# Patient Record
Sex: MALE | Race: OTHER | HISPANIC OR LATINO
[De-identification: names, ages, dates, MRNs, and addresses within clinical notes are randomized per-mention and may not be internally consistent; named-entity substitution may affect disease eponyms.]

---

## 2022-09-16 ENCOUNTER — NON-APPOINTMENT (OUTPATIENT)
Age: 42
End: 2022-09-16

## 2022-09-23 PROBLEM — Z00.00 ENCOUNTER FOR PREVENTIVE HEALTH EXAMINATION: Status: ACTIVE | Noted: 2022-09-23

## 2022-09-30 ENCOUNTER — APPOINTMENT (OUTPATIENT)
Dept: HEART AND VASCULAR | Facility: CLINIC | Age: 42
End: 2022-09-30

## 2024-09-08 ENCOUNTER — NON-APPOINTMENT (OUTPATIENT)
Age: 44
End: 2024-09-08

## 2024-09-09 ENCOUNTER — APPOINTMENT (OUTPATIENT)
Age: 44
End: 2024-09-09

## 2024-09-09 VITALS
WEIGHT: 184 LBS | HEIGHT: 69 IN | OXYGEN SATURATION: 97 % | HEART RATE: 87 BPM | BODY MASS INDEX: 27.25 KG/M2 | DIASTOLIC BLOOD PRESSURE: 78 MMHG | SYSTOLIC BLOOD PRESSURE: 106 MMHG

## 2024-09-11 ENCOUNTER — APPOINTMENT (OUTPATIENT)
Age: 44
End: 2024-09-11

## 2024-09-11 VITALS
HEART RATE: 90 BPM | HEIGHT: 69 IN | BODY MASS INDEX: 27.25 KG/M2 | OXYGEN SATURATION: 96 % | SYSTOLIC BLOOD PRESSURE: 110 MMHG | DIASTOLIC BLOOD PRESSURE: 80 MMHG | WEIGHT: 184 LBS

## 2024-09-11 DIAGNOSIS — S82.842D DISPLACED BIMALLEOLAR FRACTURE OF LEFT LOWER LEG, SUBSEQUENT ENCOUNTER FOR CLOSED FRACTURE WITH ROUTINE HEALING: ICD-10-CM

## 2024-09-11 PROCEDURE — 73610 X-RAY EXAM OF ANKLE: CPT | Mod: LT

## 2024-09-11 PROCEDURE — 27814 TREATMENT OF ANKLE FRACTURE: CPT | Mod: LT

## 2024-09-11 PROCEDURE — 99204 OFFICE O/P NEW MOD 45 MIN: CPT | Mod: 25

## 2024-09-11 NOTE — HISTORY OF PRESENT ILLNESS
[de-identified] : - Summary : The patient came for a follow-up after surgery for an ankle fracture which occurred due to a slip and fall incident in [place] on August 19th. There was immediate intervention and surgical procedure involving the insertion of a plate was performed on August 23rd.  Open reduction internal fixation of left bimalleolar ankle fracture - History of Present Illness : The patient works as a  and was in [place] for work when the accident occurred. The patient had surgery on August 23rd, which involved the insertion of a plate to address the fracture in two places. The patient has just taken off the splint and is keen to promote healing and improve mobility.

## 2024-09-11 NOTE — ASSESSMENT
[FreeTextEntry1] : - Right bimalleolar ankle fracture status post open reduction internal fixation on August 23, 2024: The patient's healing progress after surgery is satisfactory. The operative wound is clean, and the fracture healing progress as per the X-rays is satisfactory. - Therapeutic Interventions: Continue with aspirin 81mg for another one and a half weeks.  Splint was removed.  Patient was placed into a cam walker boot.  - Diagnostic Tests: Plan to review healing progress at the six-week post-operative [place] (four weeks from the current appointment).  - Patient Education: Educate the patient to move the ankle and toes, spell alphabets for mobility, and not to put weight on the ankle yet.  - Follow-Up: Set up an appointment for four weeks from now.  Anticipate weightbearing at that time.  Repeat x-rays of the left ankle at that visit 3 views AP mortise and lateral

## 2024-09-11 NOTE — END OF VISIT
[] : 4 [FeedbackText] : The system does not seem to fully grasp orthopedic language.  It would be nice to have it more tailored to orthopedics.

## 2024-09-11 NOTE — PHYSICAL EXAM
[de-identified] : Left lower extremity - Incisions healing appropriately - Able to dorsiflex planus ankle and toes - Sensation decreased on the dorsum of his foot - Foot warm well-perfused [de-identified] : 3 views of the left ankle obtained today: Demonstrates bimalleolar ankle fracture status post fixation fracture alignment in place concentric mortise  Immediate postoperative x-rays are brought in by the patient were reviewed.  Demonstrates hardware intact primary ankle fracture status post fixation.  This my independent are potation

## 2024-09-30 ENCOUNTER — APPOINTMENT (OUTPATIENT)
Age: 44
End: 2024-09-30
Payer: MEDICAID

## 2024-09-30 VITALS
HEIGHT: 69 IN | SYSTOLIC BLOOD PRESSURE: 117 MMHG | WEIGHT: 184 LBS | BODY MASS INDEX: 27.25 KG/M2 | DIASTOLIC BLOOD PRESSURE: 83 MMHG

## 2024-09-30 DIAGNOSIS — T81.30XA DISRUPTION OF WOUND, UNSPECIFIED, INITIAL ENCOUNTER: ICD-10-CM

## 2024-09-30 DIAGNOSIS — S82.842D DISPLACED BIMALLEOLAR FRACTURE OF LEFT LOWER LEG, SUBSEQUENT ENCOUNTER FOR CLOSED FRACTURE WITH ROUTINE HEALING: ICD-10-CM

## 2024-09-30 PROCEDURE — 99024 POSTOP FOLLOW-UP VISIT: CPT

## 2024-09-30 PROCEDURE — 73610 X-RAY EXAM OF ANKLE: CPT | Mod: LT

## 2024-09-30 RX ORDER — SILVER SULFADIAZINE 10 MG/G
1 CREAM TOPICAL TWICE DAILY
Qty: 1 | Refills: 0 | Status: ACTIVE | COMMUNITY
Start: 2024-09-30 | End: 1900-01-01

## 2024-10-01 NOTE — HISTORY OF PRESENT ILLNESS
[de-identified] : - Summary : The patient came for a follow-up after surgery for an ankle fracture which occurred due to a slip and fall incident in [place] on August 19th. There was immediate intervention and surgical procedure involving the insertion of a plate was performed on August 23rd.  Open reduction internal fixation of left bimalleolar ankle fracture - History of Present Illness : The patient works as a  and was in [place] for work when the accident occurred. The patient had surgery on August 23rd, which involved the insertion of a plate to address the fracture in two places. The patient has just taken off the splint and is keen to promote healing and improve mobility.  9/30/24  The patient is now approximately six weeks post-operative, and repeat X-rays of the left ankle were obtained during the current visit. The X-rays demonstrate that the hardware is in place, and the reduction is maintained. The patient reports that the ankle is feeling better, and he can balance better than before, although he has not been walking. He mentions experiencing some numbness in the area, which was also present during the previous visit. The patient inquires about submerging the wound in water and is advised to keep it dry, covering it with gauze when going out and allowing it to breathe at home.

## 2024-10-01 NOTE — ASSESSMENT
[FreeTextEntry1] : - Left Bimalleolar Ankle Fracture Status Post Open Reduction and Internal Fixation : The patient is recovering well from the left bimalleolar ankle fracture and subsequent open reduction and internal fixation surgery. The X-rays demonstrate that the hardware is in place, and the reduction is maintained.  - Therapeutic Interventions: The patient can start weight-bearing and walking with the assistance of crutches and a boot. Physical therapy will be initiated to aid in the rehabilitation process and improve range of motion. A topical medication Silvadene will be prescribed for the wound area.  - Diagnostic Tests: No additional diagnostic tests are recommended at this time.  - Referrals: A referral for physical therapy will be provided.  - Patient Education: The patient will be educated on the gradual progression of weight-bearing and walking, as well as the expected timeline for recovery. Instructions on wound care and the use of the prescribed topical medication will be provided.  - Follow-Up: The patient will be scheduled for a follow-up appointment in 2 weeks to monitor the healing progress of the wound and assess the need for further treatment or adjustments to the rehabilitation plan. 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 - Therapeutic Interventions: No specific interventions are recommended at this time, as the nerve dysfunction is expected to resolve spontaneously over time unless it is indeed severed  - Diagnostic Tests: No additional diagnostic tests are needed.  - Referrals: No referrals are necessary.  - Patient Education: The patient will be educated that the numbness is likely temporary and can take up to a year to resolve completely. Reassurance will be provided that this is a common occurrence after such surgeries.  - Follow-Up: The nerve function will be monitored during subsequent follow-up visits, and further evaluation or treatment will be considered if the symptoms persist or worsen.

## 2024-10-01 NOTE — PHYSICAL EXAM
[de-identified] : Left lower extremity - small area lateral wound not fully healed, no surrounding erythema - Able to dorsiflex PFankle and toes - Sensation decreased on the dorsum of his foot - Foot warm well-perfused [de-identified] :  Repeat X-rays of the left ankle 3 views  were obtained, demonstrating that the hardware is in place, and the reduction is maintained.

## 2024-10-14 ENCOUNTER — APPOINTMENT (OUTPATIENT)
Age: 44
End: 2024-10-14

## 2024-12-02 ENCOUNTER — APPOINTMENT (OUTPATIENT)
Age: 44
End: 2024-12-02

## 2024-12-02 VITALS
SYSTOLIC BLOOD PRESSURE: 126 MMHG | DIASTOLIC BLOOD PRESSURE: 87 MMHG | BODY MASS INDEX: 27.25 KG/M2 | HEART RATE: 67 BPM | OXYGEN SATURATION: 96 % | WEIGHT: 184 LBS | HEIGHT: 69 IN

## 2024-12-02 PROCEDURE — 99024 POSTOP FOLLOW-UP VISIT: CPT

## 2024-12-02 PROCEDURE — 73610 X-RAY EXAM OF ANKLE: CPT | Mod: LT

## 2025-04-02 ENCOUNTER — APPOINTMENT (OUTPATIENT)
Age: 45
End: 2025-04-02

## 2025-04-10 ENCOUNTER — APPOINTMENT (OUTPATIENT)
Age: 45
End: 2025-04-10
Payer: MEDICAID

## 2025-04-10 ENCOUNTER — RESULT REVIEW (OUTPATIENT)
Age: 45
End: 2025-04-10

## 2025-04-10 VITALS
BODY MASS INDEX: 27.25 KG/M2 | OXYGEN SATURATION: 97 % | SYSTOLIC BLOOD PRESSURE: 130 MMHG | DIASTOLIC BLOOD PRESSURE: 85 MMHG | HEIGHT: 69 IN | HEART RATE: 70 BPM | WEIGHT: 184 LBS

## 2025-04-10 DIAGNOSIS — M79.672 PAIN IN LEFT FOOT: ICD-10-CM

## 2025-04-10 PROCEDURE — 99213 OFFICE O/P EST LOW 20 MIN: CPT

## 2025-04-10 PROCEDURE — 73630 X-RAY EXAM OF FOOT: CPT | Mod: LT

## 2025-04-10 PROCEDURE — 73610 X-RAY EXAM OF ANKLE: CPT | Mod: LT

## 2025-07-07 ENCOUNTER — APPOINTMENT (OUTPATIENT)
Age: 45
End: 2025-07-07

## 2025-07-07 VITALS
SYSTOLIC BLOOD PRESSURE: 120 MMHG | DIASTOLIC BLOOD PRESSURE: 82 MMHG | HEIGHT: 69 IN | WEIGHT: 184 LBS | OXYGEN SATURATION: 97 % | BODY MASS INDEX: 27.25 KG/M2 | HEART RATE: 74 BPM

## 2025-07-07 PROCEDURE — 99213 OFFICE O/P EST LOW 20 MIN: CPT

## 2025-07-07 PROCEDURE — 73610 X-RAY EXAM OF ANKLE: CPT | Mod: LT
